# Patient Record
Sex: MALE | Race: WHITE
[De-identification: names, ages, dates, MRNs, and addresses within clinical notes are randomized per-mention and may not be internally consistent; named-entity substitution may affect disease eponyms.]

---

## 2017-01-23 ENCOUNTER — HOSPITAL ENCOUNTER (EMERGENCY)
Dept: HOSPITAL 10 - FTE | Age: 27
Discharge: HOME | End: 2017-01-23
Payer: MEDICARE

## 2017-01-23 VITALS
BODY MASS INDEX: 43.21 KG/M2 | WEIGHT: 308.65 LBS | WEIGHT: 308.65 LBS | HEIGHT: 71 IN | BODY MASS INDEX: 43.21 KG/M2 | HEIGHT: 71 IN

## 2017-01-23 DIAGNOSIS — I10: ICD-10-CM

## 2017-01-23 DIAGNOSIS — F17.210: ICD-10-CM

## 2017-01-23 DIAGNOSIS — J01.20: Primary | ICD-10-CM

## 2017-01-23 DIAGNOSIS — J06.9: ICD-10-CM

## 2017-01-23 DIAGNOSIS — J45.909: ICD-10-CM

## 2017-01-23 DIAGNOSIS — Z79.84: ICD-10-CM

## 2017-01-23 DIAGNOSIS — E11.9: ICD-10-CM

## 2017-01-23 PROCEDURE — 99284 EMERGENCY DEPT VISIT MOD MDM: CPT

## 2017-01-23 NOTE — ERD
ER Documentation


Chief Complaint


Date/Time


DATE: 1/23/17 


TIME: 14:42


Chief Complaint


flu symptoms with cough and congestion





HPI


The patient is a 26-year-old male with past medical history of obstructive 

sleep apnea, HTN, and asthma here with 2 days of  itchy  nose, mildly 

productive cough of green sputum, body aches, and wheezing.  He denies 

difficulty breathing, shortness of breath, chest pain, fever, chills, nausea, 

vomiting, diarrhea, or any other symptoms or concerns at this time.  He has 

tried Tylenol at home for body aches with some relief.





ROS


All systems reviewed and are negative except as per history of present illness.





Medications


Home Meds


Active Scripts


Fluticasone Propionate (Flonase Allergy Relief) 9.9 Ml [place].susp, 1 SPRAY 

NASAL DAILY, #1 BOTTLE


   TO EACH NOSTRIL


   Prov:DIANA DUMONT, TEODORO         1/23/17


Acetaminophen* (Tylenol*) 325 Mg Tablet, 2 TAB PO Q6 Y for PAIN AND OR ELEVATED 

TEMP, #20 TAB


   Prov:DIANA DUMONT NP         1/23/17


Benzonatate* (Tessalon Perle*) 100 Mg Capsule, 100 MG PO Q8H Y for COUGH for 7 

Days, CAP


   Prov:DIANA DUMONT NP         1/23/17


Benzocaine/Menthol (Chloraseptic Sore Throat Lozng) 1 Each Lozenge, 1 EACH MM 2 

HOURS AFTER MEALS for 5 Days, LOZENGE


   Prov:DIANA DUMONT NP         1/23/17


Albuterol Sulfate* (Ventolin HFA*) 18 Gm Hfa.aer.ad, 2 PUFF INHALATION Q6H, #1 

INHALER


   Prov:DIANA DUMONT NP         1/23/17


Amoxicillin/Potassium Clav (Amox-Clav 875-125 mg Tablet) 875-125 mg Tab, 1 TAB 

PO BID for 7 Days, #14 TAB


   Prov:DIANA DUMONT NP         1/23/17


Benzonatate* (Tessalon Perle*) 100 Mg Capsule, 100 MG PO Q8H Y for COUGH, #30 

CAP


   Prov:JUNIE STARR MD         11/7/16


Albuterol Sulfate* (Ventolin HFA*) 18 Gm Hfa.aer.ad, 2 PUFF INHALATION Q4H, #1 

INHALER


   Prov:JUNIE STARR MD         11/7/16


Azithromycin* (Zithromax*) 250 Mg Tablet, 250 MG PO .ZPACK AS DIRECTED, #6 TAB


   TAKE 500 MG (2 TABS) THE FIRST DAY THEN 250 MG (1 TAB) DAYS 2-5


   Prov:JUNIE STARR MD         11/7/16


Reported Medications


[denies new meds/allergies]   No Conflict Check


   11/2/13


[Same Meds]   No Conflict Check


   12/24/12


Glimepiride* (Amaryl*) 4 Mg Tablet, 4 MG PO BID


   3/7/12


Lisinopril* (Lisinopril*) 2.5 Mg Tablet, 20 MG PO DAILY, 0 Refills


   9/29/10


Albuterol Sulfate* (Proventil* Neb) 0.5 Ml Nebu


   9/29/10





Allergies


Allergies:  


Coded Allergies:  


     No Known Allergy (Unverified , 11/7/16)





PMhx/Soc


History of Surgery:  Yes (tonsillectomy)


Anesthesia Reaction:  No


Hx Neurological Disorder:  No


Hx Respiratory Disorders:  No


Hx Cardiac Disorders:  Yes (htn)


Hx Psychiatric Problems:  Yes (anxiety)


Hx Miscellaneous Medical Probl:  No


Hx Alcohol Use:  Yes


Hx Substance Use:  No


Hx Tobacco Use:  Yes (5)


Smoking Status:  Current every day smoker





Physical Exam


Vitals





Vital Signs








  Date Time  Temp Pulse Resp B/P Pulse Ox O2 Delivery O2 Flow Rate FiO2


 


1/23/17 12:11 97.8 86 20 165/86 100   








Physical Exam


INITIAL VITAL SIGNS: Reviewed by me, afebrile, no tachycardia, oximetry 100% on 

room air, no tachypnea


GENERAL: Alert. Well developed and well nourished.  No acute distress.  Nontoxic

-appearing.


HEAD: Head is normocephalic.  Atraumatic.  Right ethmoid sinus tenderness to 

palpation.


EYES: EOMI. PERRL. No scleral icterus. No conjunctival injection.  No clear or 

purulent drainage.


ENT: External ears, nose, and mouth normal.  Ear canals clear and without 

erythema or purulence.  Tympanic membranes normal, positive light reflex, no 

erythema, no bulging, no effusion.  Nares patent and without rhinorrhea.  

Oropharynx is clear and without erythema or exudates.  Tonsils are +2 and 

without erythema or exudates.  Uvula midline.  Airway patent.  Moist mucous 

membranes. 


NECK: Supple. Full range of motion. Trachea midline.  No meningismus.  No 

lymphadenopathy.


RESPIRATORY: No tachypnea. Clear to auscultation bilaterally. No wheezing, rales

, or rhonchi.


CV: Regular rate and rhythm. No murmurs, rubs, or gallops


ABDOMEN: Soft, non-distended, non-tender. Bowel sounds normal in all quadrants.


BACK: No CVA tenderness. Full ROM.


EXTREMITIES: No obvious deformity. No clubbing or cyanosis. No edema.


SKIN: Warm and dry. No diaphoresis. No obvious rashes or lesions.


NEUROLOGIC: Alert and oriented x 3. Appropriate. Face is symmetric. Speech is 

normal. Moves all extremities equally.





Procedures/MDM


Nursing Notes Reviewed


Previous Medical Records requested via [employer].





EMERGENCY DEPARTMENT COURSE / MEDICAL DECISION MAKING:





The patient comes to the ED secondary to itchy nose, mildly productive cough of 

green sputum, body aches, and wheezing 2 days.





Differential diagnosis upon initial evaluation includes but is not limited to: 

Asthma exacerbation, pneumonia, sepsis, meningitis, sinusitis, allergies, and 

others.





Final impression: 


Sinusitis


URI





Based on patient's history of present illness and physical examination the 

decision was made to discharge. There is no evidence of life threatening 

injuries or illnesses at this time.  At this time, the patient's history of 

present illness and physical exam are most consistent with right ethmoid 

sinusitis and URI, likely viral.  He had tenderness of the right ethmoid sinus 

to palpation.  He had increased headache and sinus pressure when he bent 

forward at the waist and hung his head down.  At this time I believe he is an 

appropriate candidate for outpatient management and follow-up as he is afebrile

, with a benign physical exam, clear lung sounds, no wheezes, no rhonchi, no 

stridor, oximetry 100% on room air, no tachypnea, and is generally well-

appearing and nontoxic.  Given this, I doubt any serious cause of infection 

including, but not limited to pneumonia, sepsis, or meningitis.  There were no 

wheezes on physical exam and the patient did not have any shortness of breath 

or difficulty breathing.  Given this I have a low suspicion for an asthma 

exacerbation at this time.  The patient will be prescribed Ventolin for home, 

as he states that he has run out of this medication.





On re-examination, patient resting in no distress, stable vital signs, reports 

feeling safe for discharge with outpatient follow up with PMD in 2-3 days. 

Patient given return precautions.   Patient verbalized understanding and agreed 

to return precautions.  All of his questions and concerns were addressed prior 

to discharge.  He was instructed to get plenty of fluids and plenty of rest.  

He agrees with the plan of care.





Patient's blood pressure was elevated but appears stable without evidence of 

hypertensive emergency, end organ damage, chest pain or shortness of breath. 

The patient was counseled about the risks of untreated hypertension and urged 

to pursue outpatient monitoring and therapy in 2-3 days with their primary care 

physician.  The patient is on hypertensive medications, and did not take them 

this morning as he was in a rush to come here.  He states that he usually takes 

his medications as prescribed.  He has outpatient follow-up for hypertension 

management.





Prescriptions


Augmentin


Ventolin


Chloraseptic lozenges


Tessalon Perles


Tylenol


Flonase





Departure


Diagnosis:  


 Primary Impression:  


 Sinusitis, acute ethmoidal


 Recurrence:  non-recurrent  Qualified Code:  J01.20 - Acute non-recurrent 

ethmoidal sinusitis


 Additional Impression:  


 Upper respiratory infection


 URI type:  unspecified viral URI  Qualified Code:  J06.9 - Viral upper 

respiratory tract infection


Condition:  Stable


Patient Instructions:  Sinusitis, Abx Tx


Referrals:  


your doctor





Additional Instructions:  


Call your primary care doctor TOMORROW for an appointment during the next 2-3 

days. See the doctor sooner or return here if your condition worsens before 

your appointment time.











DIANA DUMONT NP Jan 23, 2017 14:51

## 2017-05-18 ENCOUNTER — HOSPITAL ENCOUNTER (EMERGENCY)
Dept: HOSPITAL 10 - FTE | Age: 27
Discharge: HOME | End: 2017-05-18
Payer: MEDICARE

## 2017-05-18 VITALS
BODY MASS INDEX: 43.36 KG/M2 | WEIGHT: 309.75 LBS | HEIGHT: 71 IN | BODY MASS INDEX: 43.36 KG/M2 | WEIGHT: 309.75 LBS | HEIGHT: 71 IN

## 2017-05-18 DIAGNOSIS — F17.210: ICD-10-CM

## 2017-05-18 DIAGNOSIS — I10: ICD-10-CM

## 2017-05-18 DIAGNOSIS — S39.012A: Primary | ICD-10-CM
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
## 2017-05-18 PROCEDURE — 72170 X-RAY EXAM OF PELVIS: CPT

## 2017-05-18 PROCEDURE — 72100 X-RAY EXAM L-S SPINE 2/3 VWS: CPT

## 2017-05-18 NOTE — RADRPT
PROCEDURE: XR Pelvis

 

CLINICAL INDICATION: Left-sided pain

 

TECHNIQUE:   An AP radiograph was submitted.

 

COMPARISON:   None

 

FINDINGS:

 

Osseous structures: There is a asymmetrical lumbarization of S1 as well as a spina bifida occulta.  
The osseous elements otherwise appear intact.

 

Joint spaces: The hip joints appear unremarkable.  There is no distension of either joint capsule.  
the sacroiliac joints appear unremarkable without significant erosions or sclerosis.

 

Soft tissues: A phlebolith is seen in the left pelvis.

 

IMPRESSION: 

1.  Asymmetrical lumbarization of S1 with a spina bifida a occulta.

2.  Otherwise, unremarkable AP pelvis.

_____________________________________________ 

Physician Joe           Date    Time 

Electronically viewed and signed by Physician Joe on 05/18/2017 17:08 

 

D:  05/18/2017 17:08  T:  05/18/2017 17:08

/
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
## 2017-10-24 ENCOUNTER — HOSPITAL ENCOUNTER (EMERGENCY)
Dept: HOSPITAL 10 - FTE | Age: 27
Discharge: HOME | End: 2017-10-24
Payer: MEDICARE

## 2017-10-24 VITALS
BODY MASS INDEX: 43.52 KG/M2 | BODY MASS INDEX: 43.52 KG/M2 | HEIGHT: 71 IN | WEIGHT: 310.85 LBS | WEIGHT: 310.85 LBS | HEIGHT: 71 IN

## 2017-10-24 DIAGNOSIS — I10: ICD-10-CM

## 2017-10-24 DIAGNOSIS — J20.9: Primary | ICD-10-CM

## 2017-10-24 DIAGNOSIS — Z79.84: ICD-10-CM

## 2017-10-24 DIAGNOSIS — Z87.891: ICD-10-CM

## 2017-10-24 PROCEDURE — 99283 EMERGENCY DEPT VISIT LOW MDM: CPT

## 2017-10-24 NOTE — ERD
ER Documentation


Chief Complaint


Chief Complaint


SORE THROAT W/ CONGESTION X 2 DAYS





HPI


27-year-old male presents with sore throat and cough that he has had for 2 

days.  He has not yet had a fever he states.  No nausea or vomiting.  He also 

has bilateral ear pain.  He has tried over-the-counter medications but that 

does not help.   Cough is dry and worse at night.





ROS


All systems reviewed and are negative except as per history of present illness.





Medications


Home Meds


Active Scripts


Ibuprofen* (Motrin*) 800 Mg Tab, 800 MG PO Q6, #30 TAB


   Prov:AKASH LIN PA-C         10/24/17


Azithromycin* (Zithromax*) 250 Mg Tablet, 250 MG PO .ZPACK AS DIRECTED, #6 TAB


   TAKE 500 MG (2 TABS) THE FIRST DAY THEN 250 MG (1 TAB) DAYS 2-5


   Prov:AKASH LIN PA-C         10/24/17


Ibuprofen* (Motrin*) 600 Mg Tab, 600 MG PO Q6, #30 TAB


   Prov:HILARIO LUTHER PA-C         5/18/17


Fluticasone Propionate (Flonase Allergy Relief) 9.9 Ml [place].susp, 1 SPRAY 

NASAL DAILY, #1 BOTTLE


   TO EACH NOSTRIL


   Prov:DIANA DUMONT NP         1/23/17


Acetaminophen* (Tylenol*) 325 Mg Tablet, 2 TAB PO Q6 Y for PAIN AND OR ELEVATED 

TEMP, #20 TAB


   Prov:DIANA DUMONT, TEODORO         1/23/17


Benzonatate* (Tessalon Perle*) 100 Mg Capsule, 100 MG PO Q8H Y for COUGH for 7 

Days, CAP


   Prov:DIANA DUMONT NP         1/23/17


Benzocaine/Menthol (Chloraseptic Sore Throat Lozng) 1 Each Lozenge, 1 EACH MM 2 

HOURS AFTER MEALS for 5 Days, LOZENGE


   Prov:DIANA DUMONT NP         1/23/17


Albuterol Sulfate* (Ventolin HFA*) 18 Gm Hfa.aer.ad, 2 PUFF INHALATION Q6H, #1 

INHALER


   Prov:DIANA DUMONT, TEODORO         1/23/17


Amoxicillin/Potassium Clav (Amox-Clav 875-125 mg Tablet) 875-125 mg Tab, 1 TAB 

PO BID for 7 Days, #14 TAB


   Prov:DIANA DUMONT, NP         1/23/17


Benzonatate* (Tessalon Perle*) 100 Mg Capsule, 100 MG PO Q8H Y for COUGH, #30 

CAP


   Prov:JUNIE STARR MD         11/7/16


Albuterol Sulfate* (Ventolin HFA*) 18 Gm Hfa.aer.ad, 2 PUFF INHALATION Q4H, #1 

INHALER


   Prov:JUNIE STARR MD         11/7/16


Azithromycin* (Zithromax*) 250 Mg Tablet, 250 MG PO .ZPACK AS DIRECTED, #6 TAB


   TAKE 500 MG (2 TABS) THE FIRST DAY THEN 250 MG (1 TAB) DAYS 2-5


   Prov:JUNIE STARR MD         11/7/16


Reported Medications


[denies new meds/allergies]   No Conflict Check


   11/2/13


[Same Meds]   No Conflict Check


   12/24/12


Glimepiride* (Amaryl*) 4 Mg Tablet, 4 MG PO BID


   3/7/12


Lisinopril* (Lisinopril*) 2.5 Mg Tablet, 20 MG PO DAILY, 0 Refills


   9/29/10


Albuterol Sulfate* (Proventil* Neb) 0.5 Ml Nebu


   9/29/10





Allergies


Allergies:  


Coded Allergies:  


     No Known Allergy (Unverified , 10/24/17)





PMhx/Soc


History of Surgery:  Yes (tonsillectomy)


Anesthesia Reaction:  No


Hx Neurological Disorder:  No


Hx Respiratory Disorders:  No


Hx Cardiac Disorders:  Yes (htn)


Hx Psychiatric Problems:  Yes (anxiety)


Hx Miscellaneous Medical Probl:  No


Hx Alcohol Use:  Yes


Hx Substance Use:  No (marijuana)


Hx Tobacco Use:  Yes (5)


Smoking Status:  Former smoker





FmHx


Family History:  No diabetes





Physical Exam


Vitals





Vital Signs








  Date Time  Temp Pulse Resp B/P Pulse Ox O2 Delivery O2 Flow Rate FiO2


 


10/24/17 14:18 98.7 83 18 176/87 98   








Physical Exam


INITIAL VITAL SIGNS: Reviewed by me


GENERAL: Awake, alert and oriented x 4, well appearing, nontoxic, speaking in 

full sentences. No acute distress


HEAD: Atraumatic


NECK: Supple. No masses. Full range of motion.  No meningismus. No midline 

tenderness. 


EYES: EOMI. PERRL.


EAR: No tenderness over the mastoids bilaterally. No exudates in the canals. 

TMs nonerythematous.


NOSE: Normal nose.


THROAT: No tonilar erythema or edema. No exudates. Uvula midline. No kissing 

tonsils.


RESPIRATORY: Clear to auscultation bilaterally. Symmetric chest wall rise.  No 

wheezing or rales. No accessory muscle use.  


CV: Regular rate and rhythm. No murmurs, rubs, or gallops.  


ABDOMEN: Soft, non-distended. Nontender. Negative [place]. Negative [place] 

point tenderness. No CVA tenderness bilaterally. No guarding. No rebound.





Procedures/MDM


Patient presents with URI versus bronchitis.  Patient's blood pressure was 

elevated (>120/80) but appears stable without evidence of hypertension 

emergency or urgency.  The patient was counseled about the risks of 

hypertension and urged to pursue outpatient monitoring and therapy within a 

week with their primary care physician.  Discharge with improvement and 

azithromycin.  Patient counseled regarding my diagnostic impression and care 

plan. Prior to discharge all questions answered. Pt agrees with treatment plan 

and understands strict return precautions. Pt is instructed to follow up with 

primary care provider within 24-48 hours. Precautionary instructions provided 

including instructions to return to the ER if not improving or for any 

worsening or changing symptoms or concerns.





Departure


Diagnosis:  


 Primary Impression:  


 Bronchitis


Condition:  Stable


Patient Instructions:  Bronchitis, Antiobiotic Treatment (Adult)





Additional Instructions:  


Call your primary care doctor TOMORROW for an appointment during the next 1-2 

days.See the doctor sooner or return here if your condition worsens before your 

appointment time.











AKASH LIN PA-C Oct 24, 2017 15:02

## 2017-10-28 ENCOUNTER — HOSPITAL ENCOUNTER (EMERGENCY)
Dept: HOSPITAL 10 - FTE | Age: 27
Discharge: HOME | End: 2017-10-28
Payer: MEDICARE

## 2017-10-28 VITALS
BODY MASS INDEX: 43.21 KG/M2 | BODY MASS INDEX: 43.21 KG/M2 | WEIGHT: 308.65 LBS | HEIGHT: 71 IN | WEIGHT: 308.65 LBS | HEIGHT: 71 IN

## 2017-10-28 DIAGNOSIS — Z79.84: ICD-10-CM

## 2017-10-28 DIAGNOSIS — F17.210: ICD-10-CM

## 2017-10-28 DIAGNOSIS — M62.830: Primary | ICD-10-CM

## 2017-10-28 DIAGNOSIS — I10: ICD-10-CM

## 2017-10-28 PROCEDURE — 72100 X-RAY EXAM L-S SPINE 2/3 VWS: CPT
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
## 2018-04-22 ENCOUNTER — HOSPITAL ENCOUNTER (EMERGENCY)
Age: 28
LOS: 1 days | Discharge: HOME | End: 2018-04-23

## 2018-04-22 ENCOUNTER — HOSPITAL ENCOUNTER (EMERGENCY)
Dept: HOSPITAL 91 - E/R | Age: 28
LOS: 1 days | Discharge: HOME | End: 2018-04-23
Payer: MEDICARE

## 2018-04-22 DIAGNOSIS — R40.2142: ICD-10-CM

## 2018-04-22 DIAGNOSIS — Z79.84: ICD-10-CM

## 2018-04-22 DIAGNOSIS — I10: ICD-10-CM

## 2018-04-22 DIAGNOSIS — T24.232A: Primary | ICD-10-CM
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
## 2018-04-22 DIAGNOSIS — Z87.891: ICD-10-CM

## 2018-04-22 DIAGNOSIS — R40.2362: ICD-10-CM
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
## 2018-04-22 DIAGNOSIS — R40.2252: ICD-10-CM

## 2018-04-22 PROCEDURE — 16000 INITIAL TREATMENT OF BURN(S): CPT

## 2018-04-22 PROCEDURE — 99284 EMERGENCY DEPT VISIT MOD MDM: CPT

## 2018-04-23 RX ADMIN — HYDROCODONE BITARTRATE AND ACETAMINOPHEN 1 TAB: 5; 325 TABLET ORAL at 01:25

## 2018-04-23 RX ADMIN — SILVER SULFADIAZINE 1 APPLIC: 10 CREAM TOPICAL at 01:25

## 2018-05-16 ENCOUNTER — HOSPITAL ENCOUNTER (EMERGENCY)
Dept: HOSPITAL 91 - FTE | Age: 28
Discharge: HOME | End: 2018-05-16
Payer: MEDICARE

## 2018-05-16 DIAGNOSIS — M25.572: ICD-10-CM

## 2018-05-16 DIAGNOSIS — Z87.891: ICD-10-CM

## 2018-05-16 DIAGNOSIS — M25.531: Primary | ICD-10-CM

## 2018-05-16 DIAGNOSIS — I10: ICD-10-CM

## 2018-05-16 DIAGNOSIS — Z79.84: ICD-10-CM

## 2018-05-16 DIAGNOSIS — M25.522: ICD-10-CM

## 2018-05-16 DIAGNOSIS — M54.5: ICD-10-CM

## 2018-05-16 PROCEDURE — 73080 X-RAY EXAM OF ELBOW: CPT

## 2018-05-16 PROCEDURE — 99284 EMERGENCY DEPT VISIT MOD MDM: CPT

## 2018-05-16 PROCEDURE — 72100 X-RAY EXAM L-S SPINE 2/3 VWS: CPT

## 2018-05-16 PROCEDURE — 73610 X-RAY EXAM OF ANKLE: CPT

## 2018-05-16 PROCEDURE — 73110 X-RAY EXAM OF WRIST: CPT

## 2018-07-14 ENCOUNTER — HOSPITAL ENCOUNTER (EMERGENCY)
Dept: HOSPITAL 91 - FTE | Age: 28
Discharge: HOME | End: 2018-07-14
Payer: MEDICARE

## 2018-07-14 ENCOUNTER — HOSPITAL ENCOUNTER (EMERGENCY)
Age: 28
Discharge: HOME | End: 2018-07-14

## 2018-07-14 DIAGNOSIS — I10: ICD-10-CM

## 2018-07-14 DIAGNOSIS — M54.5: ICD-10-CM

## 2018-07-14 DIAGNOSIS — R51: Primary | ICD-10-CM

## 2018-07-14 PROCEDURE — 99284 EMERGENCY DEPT VISIT MOD MDM: CPT

## 2018-11-27 ENCOUNTER — HOSPITAL ENCOUNTER (EMERGENCY)
Age: 28
Discharge: HOME | End: 2018-11-27

## 2019-03-27 ENCOUNTER — HOSPITAL ENCOUNTER (EMERGENCY)
Dept: HOSPITAL 91 - FTE | Age: 29
LOS: 1 days | Discharge: HOME | End: 2019-03-28
Payer: MEDICARE

## 2019-03-27 ENCOUNTER — HOSPITAL ENCOUNTER (EMERGENCY)
Dept: HOSPITAL 10 - FTE | Age: 29
LOS: 1 days | Discharge: HOME | End: 2019-03-28
Payer: MEDICARE

## 2019-03-27 VITALS
BODY MASS INDEX: 41.05 KG/M2 | HEIGHT: 71 IN | HEIGHT: 71 IN | WEIGHT: 293.21 LBS | BODY MASS INDEX: 41.05 KG/M2 | WEIGHT: 293.21 LBS

## 2019-03-27 DIAGNOSIS — I10: ICD-10-CM

## 2019-03-27 DIAGNOSIS — J01.20: Primary | ICD-10-CM

## 2019-03-27 DIAGNOSIS — H66.93: ICD-10-CM

## 2019-03-27 PROCEDURE — 99283 EMERGENCY DEPT VISIT LOW MDM: CPT

## 2019-03-28 VITALS — HEART RATE: 92 BPM | SYSTOLIC BLOOD PRESSURE: 142 MMHG | RESPIRATION RATE: 17 BRPM | DIASTOLIC BLOOD PRESSURE: 76 MMHG

## 2019-03-28 RX ADMIN — IBUPROFEN 1 MG: 800 TABLET, FILM COATED ORAL at 04:10

## 2019-03-28 RX ADMIN — GUAIFENESIN AND DEXTROMETHORPHAN 1 ML: 100; 10 SYRUP ORAL at 04:11

## 2019-03-28 NOTE — ERD
ER Documentation


Chief Complaint


Chief Complaint





fever/cough/runny nose x 3 days





HPI


This is a 28-year-old male presents emergency department with complaints of 


sinus congestion, fever, cough for about 3 days.





Denies headache, head injury, loss of consciousness, dizziness, neck pain, neck 


stiffness, throat pain, difficulty swallowing, difficulty breathing lying flat, 


shoulder pain, chest pain, back pain, abdominal pain, nausea, vomiting, co


nstipation, diarrhea, urinary symptoms, loss of bowel and bladder control, 


trauma, injury, falls, difficulty walking due to pain, numbness or tingling 


sensation, calf pain, recent travel, recent major surgery in the last 3 weeks, 


calf pain, recent long travel, recent exposure to any illness, recent antibiotic


use in the last 3 months, chills, seizures.





Past medical history:


Surgical history: Tonsillectomy.


Social: Denies smoking, use of alcoholic beverages, use of illegal drugs.





ROS


All systems reviewed and are negative except as per history of present illness.





Medications


Home Meds


Active Scripts


Mometasone Furoate* (Nasonex*) 50 Mcg/Spray - 17 Gm [place].pump, 1 SPRAY NASAL 


DAILY, #1 BOTTLE


   TO EACH NOSTRIL


   Prov:PASILARIANA JONAS ALFRED         3/28/19


Benzonatate* (Tessalon Perle*) 100 Mg Capsule, 100 MG PO Q8H PRN for COUGH, #15 


CAP


   Prov:PASILABANRIANA ALFRED         3/28/19


Oseltamivir Phosphate* (Tamiflu*) 75 Mg Capsule, 75 MG PO BID for 5 Days, CAP


   Prov:PASILARIANA JONAS ALFRED         3/28/19


Loratadine* (Loratadine*) 10 Mg Tablet, 10 MG PO DAILY, #30 TAB


   Prov:PASILARIANA JONAS ALFRED         3/28/19


Ibuprofen* (Motrin*) 800 Mg Tab, 800 MG PO Q6H PRN for PAIN AND OR ELEVATED 


TEMP, #30 TAB


   Prov:PASILABANRIANA ALFRED         3/28/19


Amoxicillin/Potassium Clav (Amox-Clav 875-125 mg Tablet) 875-125 mg Tab, 1 TAB 


PO BID for 10 Days, #20 TAB


   Prov:PASILARIANA JONAS ALFRED         3/28/19


Lorazepam* (Ativan*) 0.5 Mg Tablet, 0.5 MG PO Q8H PRN for ANXIETY, #8 TAB


   Prov:KEELY,PAM B. MD         11/27/18





Allergies


Allergies:  


Coded Allergies:  


     No Known Allergy (Unverified , 11/27/18)





PMhx/Soc


History of Surgery:  Yes (tonsillectomy)


Anesthesia Reaction:  No


Hx Neurological Disorder:  No


Hx Respiratory Disorders:  No


Hx Cardiac Disorders:  Yes (HTN)


Hx Psychiatric Problems:  Yes (anxiety)


Hx Miscellaneous Medical Probl:  No


Hx Alcohol Use:  No


Hx Substance Use:  No


Hx Tobacco Use:  No





Physical Exam


Vitals





Physical Exam


Const:   No acute distress


Head:   Atraumatic 


Eyes:    Normal Conjunctiva


ENT:    Normal External Ears, Nose and Mouth.  Right ear: TM is erythematous.  


No lesions no discharge.  No hearing loss.  No mastoid tenderness.  Left ear: 


TMs are erythematous.  No bleeding.  No discharge.  No hearing loss.  No mastoid


tenderness.  Nose: There is frontal/ethmoid/maxillary sinus tenderness 


palpation.  Throat: Uvula is midline and nondisplaced.  Tolerating secretions.  


Patent airway.


Neck:               Full range of motion. No meningismus.  No nuchal rigidity 


with no signs of meningeal irritation.


Resp:   Clear to auscultation bilaterally.  No accessory muscle use in 


breathing.


Cardio:   Regular rate and rhythm, no murmurs


Abd:    Soft, non tender, non distended. Normal bowel sounds


Skin:   No petechiae or rashes


Back:   No midline or flank tenderness


Ext:    No cyanosis, or edema


Neur:   Awake and alert.  No neurological deficit.


Psych:    Normal Mood and Affect


Results 24 hrs





Current Medications


 Medications
   Dose
          Sig/Elle
       Start Time
   Status  Last


 (Trade)       Ordered        Route
 PRN     Stop Time              Admin
Dose


                              Reason                                Admin


 Ibuprofen
     800 mg         ONCE  ONCE
    3/28/19       DC           3/28/19


(Motrin)                      PO
            03:30
                       04:10



                                             3/28/19 03:31


                10 ml          ONCE  ONCE
    3/28/19       DC       



Guaifenesin/
                 PO
            03:30



Dextromethorp                                3/28/19 03:59


luna



(Robitussin


Dm
 Liquid


Cup)


 Guaifenesin
   200 mg         ONCE  ONCE
    3/28/19       DC       



 (Robitussin
                 PO
            04:00



Liquid Cup)                                  3/28/19 04:03


                10 ml          ONCE  ONCE
    3/28/19       DC           3/28/19


Guaifenesin/
                 PO
            04:30
                       04:11



Dextromethorp                                3/28/19 04:31


luna



(Robitussin


Dm
 Liquid


Cup)








Procedures/MDM


Offered diagnostic tests but patient strongly refused.  Stated that he wants to 


go home and does not want to wait.


Diagnostic tests: Clinical exam.





Treatment: Motrin.  Robafen.





Re-evaluation: Denies pain.  Not in distress.  No neurological deficits.  Steady


gait.  Stated that he feels much better at this time and that he is ready to go 


home.





Differential diagnosis


I have low suspicion for sepsis, meningitis, cystitis, peritonsillar abscess, 


airway obstruction, pneumonia, bronchospasm.





Final diagnosis: Otitis media.  Sinusitis.  Cough.  Influenza-like symptoms.





Prescription: Augmentin.  Tamiflu.  Motrin.  Tessalon Perles.  Claritin.





Follow-up with PCP in the next 24-48 hours.  Follow-up with your ENT in the next


24-48 hours.  Come back here in the emergency department for any new symptoms or


any worsening symptoms.  





All questions and concerns were answered.  Patient and family members verbalized


understanding and agreed with plan of care.  





Hemodynamically stable on discharge.





Departure


Diagnosis:  


   Primary Impression:  


   Influenza-like symptoms


   Additional Impressions:  


   Sinusitis, acute ethmoidal


   Otitis media


Condition:  Stable





Additional Instructions:  


Follow-up with PCP in the next 24-48 hours.  Follow-up with your ENT in the next


24-48 hours.  Come back here in the emergency department for any new symptoms or


any worsening symptoms.











RIANA SIMENTAL               Mar 28, 2019 03:10